# Patient Record
Sex: FEMALE | Race: WHITE | NOT HISPANIC OR LATINO | Employment: STUDENT | URBAN - METROPOLITAN AREA
[De-identification: names, ages, dates, MRNs, and addresses within clinical notes are randomized per-mention and may not be internally consistent; named-entity substitution may affect disease eponyms.]

---
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
## 2025-03-11 ENCOUNTER — OFFICE VISIT (OUTPATIENT)
Dept: FAMILY MEDICINE CLINIC | Facility: CLINIC | Age: 20
End: 2025-03-11
Payer: COMMERCIAL

## 2025-03-11 VITALS
OXYGEN SATURATION: 100 % | TEMPERATURE: 98.7 F | BODY MASS INDEX: 26.74 KG/M2 | HEIGHT: 71 IN | WEIGHT: 191 LBS | RESPIRATION RATE: 16 BRPM | HEART RATE: 69 BPM | DIASTOLIC BLOOD PRESSURE: 74 MMHG | SYSTOLIC BLOOD PRESSURE: 114 MMHG

## 2025-03-11 DIAGNOSIS — R51.9 FREQUENT HEADACHES: ICD-10-CM

## 2025-03-11 DIAGNOSIS — G43.009 MIGRAINE WITHOUT AURA AND WITHOUT STATUS MIGRAINOSUS, NOT INTRACTABLE: ICD-10-CM

## 2025-03-11 DIAGNOSIS — H43.393 FLOATERS IN VISUAL FIELD, BILATERAL: Primary | ICD-10-CM

## 2025-03-11 NOTE — PROGRESS NOTES
Subjective   Mami Altman is a 19 y.o. female  Migraine (Here for f/u, still having migraines typically twice a week, along with constant floaters in both eyes for about a year )      History of Present Illness  History of Present Illness  The patient is a 19-year-old female who presents today for ongoing migraine headaches.    She reports a decrease in the frequency of her migraines, now experiencing them approximately once a month or less. She has not required the use of Ubrelvy. Her current symptoms are more consistent with regular headaches, occurring twice weekly, localized to the forehead, without any specific triggers. She reports no associated photophobia, nausea, or vomiting. She also reports persistent floaters in her vision for the past year, which are more noticeable outdoors. An ophthalmological evaluation in 09/2024 did not reveal any abnormalities, but she notes a recent exacerbation of her symptoms. She reports no neck-related issues, recent head injuries, or concussion-related symptoms. She is currently active in volleyball. She manages these headaches with Advil, which effectively alleviates the pain within an hour.    SOCIAL HISTORY  She goes to [employer].    MEDICATIONS  Advil    The following portions of the patient's history were reviewed and updated as appropriate: allergies, current medications, past social history and problem list    Review of Systems   Constitutional:  Negative for fatigue and unexpected weight change.   HENT:  Negative for congestion, dental problem, postnasal drip, sinus pressure and sore throat.    Eyes:  Positive for visual disturbance. Negative for photophobia and pain.   Gastrointestinal:  Negative for nausea and vomiting.   Neurological:  Positive for headaches. Negative for dizziness, syncope, facial asymmetry, speech difficulty, weakness, light-headedness and numbness.   Psychiatric/Behavioral:  Negative for agitation, confusion, dysphoric mood and  sleep disturbance. The patient is not nervous/anxious.        Objective     Vitals:    03/11/25 1558   BP: 114/74   Pulse: 69   Resp: 16   Temp: 98.7 °F (37.1 °C)   SpO2: 100%       Physical Exam  Vitals and nursing note reviewed.   Constitutional:       General: She is not in acute distress.     Appearance: Normal appearance. She is well-developed. She is not ill-appearing, toxic-appearing or diaphoretic.   HENT:      Head: Normocephalic and atraumatic.   Eyes:      General: No scleral icterus.        Right eye: No discharge.         Left eye: No discharge.      Extraocular Movements: Extraocular movements intact.      Conjunctiva/sclera: Conjunctivae normal.      Pupils: Pupils are equal, round, and reactive to light.   Cardiovascular:      Rate and Rhythm: Normal rate and regular rhythm.   Pulmonary:      Effort: Pulmonary effort is normal.   Musculoskeletal:      Cervical back: Normal range of motion and neck supple. No rigidity.   Neurological:      Mental Status: She is alert and oriented to person, place, and time.      Cranial Nerves: No cranial nerve deficit.      Sensory: No sensory deficit.      Motor: No weakness.      Coordination: Coordination normal.   Psychiatric:         Mood and Affect: Mood normal.         Speech: Speech normal.         Behavior: Behavior normal.         Thought Content: Thought content normal.         Judgment: Judgment normal.       Physical Exam  Throat was examined. Nose appears normal. Eyes were examined.    Assessment & Plan   Assessment & Plan  1. Migraine headaches.  Her symptoms do not align with a sinus infection or allergic reaction. The etiology of her symptoms could be related to the underlying cause of her floaters. A referral to an ophthalmologist has been initiated for further evaluation. She is advised to continue her current regimen of Advil for headache management. If the ophthalmologist does not contact her by the end of this week, she should inform the  office.    Diagnoses and all orders for this visit:    1. Floaters in visual field, bilateral (Primary)  -     Ambulatory Referral to Ophthalmology    2. Frequent headaches  -     Ambulatory Referral to Ophthalmology    3. Migraine without aura and without status migrainosus, not intractable     I spent 25 minutes in patient care: Reviewing records prior to the visit, examining the patient, entering orders and documentation    Part of this note may be an electronic transcription/translation of spoken language to printed text using the Dragon Dictation System.        Patient or patient representative verbalized consent for the use of Ambient Listening during the visit with  Jayne Haile PA-C for chart documentation. 3/12/2025  12:32 EDT